# Patient Record
Sex: MALE | Race: BLACK OR AFRICAN AMERICAN | NOT HISPANIC OR LATINO | RURAL
[De-identification: names, ages, dates, MRNs, and addresses within clinical notes are randomized per-mention and may not be internally consistent; named-entity substitution may affect disease eponyms.]

---

## 2023-04-11 ENCOUNTER — HOSPITAL ENCOUNTER (EMERGENCY)
Facility: HOSPITAL | Age: 28
Discharge: HOME | End: 2023-04-11

## 2023-04-11 VITALS
BODY MASS INDEX: 36.32 KG/M2 | WEIGHT: 205 LBS | HEART RATE: 110 BPM | OXYGEN SATURATION: 99 % | RESPIRATION RATE: 18 BRPM | SYSTOLIC BLOOD PRESSURE: 147 MMHG | DIASTOLIC BLOOD PRESSURE: 93 MMHG | HEIGHT: 63 IN | TEMPERATURE: 100 F

## 2023-04-11 DIAGNOSIS — K08.89 PAIN, DENTAL: Primary | ICD-10-CM

## 2023-04-11 PROCEDURE — 99284 PR EMERGENCY DEPT VISIT,LEVEL IV: ICD-10-PCS | Mod: ,,, | Performed by: NURSE PRACTITIONER

## 2023-04-11 PROCEDURE — 99284 EMERGENCY DEPT VISIT MOD MDM: CPT | Mod: ,,, | Performed by: NURSE PRACTITIONER

## 2023-04-11 PROCEDURE — 99284 EMERGENCY DEPT VISIT MOD MDM: CPT

## 2023-04-11 RX ORDER — IBUPROFEN 800 MG/1
800 TABLET ORAL 3 TIMES DAILY
Qty: 15 TABLET | Refills: 0 | Status: SHIPPED | OUTPATIENT
Start: 2023-04-11 | End: 2023-04-16

## 2023-04-11 RX ORDER — AMOXICILLIN 500 MG/1
500 CAPSULE ORAL 3 TIMES DAILY
Qty: 30 CAPSULE | Refills: 0 | Status: SHIPPED | OUTPATIENT
Start: 2023-04-11 | End: 2023-04-21

## 2023-04-11 NOTE — ED PROVIDER NOTES
Encounter Date: 4/11/2023       History     Chief Complaint   Patient presents with    Dental Pain     27-year-old male presents to the emergency department to be evaluated for dental pain that began a few weeks ago and got much worse today. He is going to see a dentist in [place] tomorrow.     The history is provided by the patient.   Dental Pain  Primary symptoms do not include mouth pain, dental injury, oral bleeding, oral lesions, headaches, fever, sore throat or angioedema.   Additional symptoms include: gum tenderness. Additional symptoms do not include: dental sensitivity to temperature, gum swelling, purulent gums, trismus, jaw pain, facial swelling, trouble swallowing, pain with swallowing, excessive salivation, dry mouth, taste disturbance, smell disturbance, drooling, ear pain, hearing loss, nosebleeds, swollen glands, goiter and fatigue.   Review of patient's allergies indicates:  No Known Allergies  History reviewed. No pertinent past medical history.  History reviewed. No pertinent surgical history.  History reviewed. No pertinent family history.  Social History     Tobacco Use    Smoking status: Never    Smokeless tobacco: Never   Substance Use Topics    Alcohol use: Not Currently    Drug use: Yes     Types: Marijuana     Review of Systems   Constitutional:  Negative for fatigue and fever.   HENT:  Negative for drooling, ear pain, facial swelling, hearing loss, nosebleeds, sore throat and trouble swallowing.    Neurological:  Negative for headaches.   All other systems reviewed and are negative.    Physical Exam     Initial Vitals [04/11/23 1447]   BP Pulse Resp Temp SpO2   (!) 147/93 110 18 99.6 °F (37.6 °C) 99 %      MAP       --         Physical Exam    Vitals reviewed.  Constitutional: He appears well-developed and well-nourished.   HENT:   Mouth/Throat: Oropharynx is clear and moist and mucous membranes are normal.       Neck: Neck supple.   Cardiovascular:            tachycardia    Pulmonary/Chest: Breath sounds normal.   Abdominal: Abdomen is soft. Bowel sounds are normal. He exhibits no distension and no mass. There is no abdominal tenderness. There is no rebound and no guarding.   Musculoskeletal:         General: Normal range of motion.      Cervical back: Neck supple.     Neurological: He is alert and oriented to person, place, and time. He has normal strength. GCS score is 15. GCS eye subscore is 4. GCS verbal subscore is 5. GCS motor subscore is 6.   Skin: Skin is warm and dry. Capillary refill takes less than 2 seconds.   Psychiatric: He has a normal mood and affect.       Medical Screening Exam   See Full Note    ED Course   Procedures  Labs Reviewed - No data to display       Imaging Results    None          Medications - No data to display  Medical Decision Makin-year-old male presents to the emergency department to be evaluated for dental pain that began a few weeks ago and got much worse today. He is going to see a dentist in [place] tomorrow.   Diagnosis:  Dental pain  Prescribed amoxicillin and Motrin                 Clinical Impression:   Final diagnoses:  [K08.89] Pain, dental (Primary)        ED Disposition Condition    Discharge Stable          ED Prescriptions       Medication Sig Dispense Start Date End Date Auth. Provider    amoxicillin (AMOXIL) 500 MG capsule Take 1 capsule (500 mg total) by mouth 3 (three) times daily. for 10 days 30 capsule 2023 NICOLA Fernandez    ibuprofen (ADVIL,MOTRIN) 800 MG tablet Take 1 tablet (800 mg total) by mouth 3 (three) times daily. for 5 days 15 tablet 2023 NICOLA Fernandez          Follow-up Information    None          NICOLA Fernandez  [phone]

## 2023-04-11 NOTE — DISCHARGE INSTRUCTIONS
Follow-up with your dentist tomorrow.  Return to the emergency department for any increase in symptoms or for any other new or worrisome symptoms.

## 2023-06-06 ENCOUNTER — HOSPITAL ENCOUNTER (EMERGENCY)
Facility: HOSPITAL | Age: 28
Discharge: HOME | End: 2023-06-06
Attending: EMERGENCY MEDICINE

## 2023-06-06 VITALS
WEIGHT: 205 LBS | HEART RATE: 90 BPM | DIASTOLIC BLOOD PRESSURE: 87 MMHG | BODY MASS INDEX: 36.32 KG/M2 | TEMPERATURE: 99 F | HEIGHT: 63 IN | RESPIRATION RATE: 16 BRPM | SYSTOLIC BLOOD PRESSURE: 136 MMHG | OXYGEN SATURATION: 97 %

## 2023-06-06 DIAGNOSIS — K04.7 DENTAL ABSCESS: Primary | ICD-10-CM

## 2023-06-06 PROCEDURE — 99284 EMERGENCY DEPT VISIT MOD MDM: CPT | Mod: ,,, | Performed by: EMERGENCY MEDICINE

## 2023-06-06 PROCEDURE — 99284 EMERGENCY DEPT VISIT MOD MDM: CPT

## 2023-06-06 PROCEDURE — 99284 PR EMERGENCY DEPT VISIT,LEVEL IV: ICD-10-PCS | Mod: ,,, | Performed by: EMERGENCY MEDICINE

## 2023-06-06 RX ORDER — AMOXICILLIN 875 MG/1
875 TABLET, FILM COATED ORAL 2 TIMES DAILY
Qty: 20 TABLET | Refills: 0 | Status: SHIPPED | OUTPATIENT
Start: 2023-06-06 | End: 2023-06-16

## 2023-06-06 RX ORDER — IBUPROFEN 600 MG/1
600 TABLET ORAL EVERY 6 HOURS PRN
Qty: 20 TABLET | Refills: 0 | Status: SHIPPED | OUTPATIENT
Start: 2023-06-06

## 2023-06-06 NOTE — ED PROVIDER NOTES
Encounter Date: 6/6/2023       History     Chief Complaint   Patient presents with    Dental Pain     26 Y/O MALE WITH LEFT LOWER POSTERIOR DENTAL PAIN.  HE SAYS THAT HE HAS A WISDOM TOOTH THAT IS SIDEWAYS.  PAIN STARTED LAST NIGHT AND IS SEVERE.  HE SAYS HE HAD A FIRST EPISODE OF THIS A MONTH AGO AND DID NOT FOLLOW UP WITH A DENTIST.  PAIN IS WORSE WITH ANY MOVEMENT OR TOUCH.      Review of patient's allergies indicates:  No Known Allergies  No past medical history on file.  No past surgical history on file.  No family history on file.  Social History     Tobacco Use    Smoking status: Never    Smokeless tobacco: Never   Substance Use Topics    Alcohol use: Not Currently    Drug use: Yes     Types: Marijuana     Review of Systems   All other systems reviewed and are negative.    Physical Exam     Initial Vitals [06/06/23 0846]   BP Pulse Resp Temp SpO2   136/87 90 16 99.4 °F (37.4 °C) 97 %      MAP       --         Physical Exam    Nursing note and vitals reviewed.  Constitutional: He appears well-developed and well-nourished. He appears distressed.   HENT:   Head: Normocephalic and atraumatic.   Nose: Nose normal.   Mouth/Throat: Oropharynx is clear and moist.   LEFT LOWER POSTERIOR GINGIVAL SWELLING AND ERYTHEMA, AREA APPEARS MACERATED TO SOME EXTENT.     Eyes: Conjunctivae and EOM are normal. Pupils are equal, round, and reactive to light.   Neck: Neck supple.   Normal range of motion.  Cardiovascular:  Normal rate, regular rhythm and normal heart sounds.           Pulmonary/Chest: Breath sounds normal.   Abdominal: Abdomen is soft. Bowel sounds are normal.   Musculoskeletal:         General: Normal range of motion.      Cervical back: Normal range of motion and neck supple.     Neurological: He is alert and oriented to person, place, and time. GCS score is 15. GCS eye subscore is 4. GCS verbal subscore is 5. GCS motor subscore is 6.       Medical Screening Exam   See Full Note    ED Course   Procedures  Labs  Reviewed - No data to display       Imaging Results    None          Medications - No data to display  Medical Decision Making:   Initial Assessment:   DENTAL PAIN.  IMPACTED WISDOM TOOTH.   Differential Diagnosis:   DDX:  CELLULITIS VS ABSCESS VS OTHER  ED Management:  DX:  DENTAL ABSCESS.  RX ANTIBIOTIC AND NSAID.  FOLLOW UP WITH DENTIST ASAP.                         Clinical Impression:   Final diagnoses:  [K04.7] Dental abscess (Primary)        ED Disposition Condition    Discharge Stable          ED Prescriptions    None       Follow-up Information    None          Fercho Nelson MD  06/06/23 0837

## 2023-06-06 NOTE — ED TRIAGE NOTES
Pt reports wisdom tooth pain and gum swelling that started last night. he reports this happened last month but does not have insurance to go to the Dentist.

## 2023-06-06 NOTE — Clinical Note
"Wicho Avery" Kolby was seen and treated in our emergency department on 6/6/2023.  He may return to work on 06/07/2023.       If you have any questions or concerns, please don't hesitate to call.      Yumiko Parada RN    "

## 2023-06-06 NOTE — DISCHARGE INSTRUCTIONS
TAKE AMOXIL AND IBUPROFEN AS DIRECTED.  FOLLOW UP IF SYMPTOMS PERSIST OR WORSEN OR OTHERWISE AS NEEDED.